# Patient Record
Sex: FEMALE
[De-identification: names, ages, dates, MRNs, and addresses within clinical notes are randomized per-mention and may not be internally consistent; named-entity substitution may affect disease eponyms.]

---

## 2019-03-13 ENCOUNTER — HOSPITAL ENCOUNTER (OUTPATIENT)
Dept: HOSPITAL 90 - RAH | Age: 74
Discharge: HOME | End: 2019-03-13
Attending: INTERNAL MEDICINE
Payer: MEDICARE

## 2019-03-13 DIAGNOSIS — I82.402: Primary | ICD-10-CM

## 2019-03-13 PROCEDURE — 93971 EXTREMITY STUDY: CPT

## 2019-06-20 ENCOUNTER — HOSPITAL ENCOUNTER (INPATIENT)
Dept: HOSPITAL 90 - EDH | Age: 74
LOS: 4 days | Discharge: NURSING, REHAB OR LONG-TERM CARE | DRG: 291 | End: 2019-06-24
Attending: INTERNAL MEDICINE | Admitting: INTERNAL MEDICINE
Payer: MEDICARE

## 2019-06-20 VITALS — WEIGHT: 251.8 LBS | BODY MASS INDEX: 44.61 KG/M2 | HEIGHT: 63 IN

## 2019-06-20 DIAGNOSIS — D64.9: ICD-10-CM

## 2019-06-20 DIAGNOSIS — I50.33: ICD-10-CM

## 2019-06-20 DIAGNOSIS — I11.0: Primary | ICD-10-CM

## 2019-06-20 DIAGNOSIS — B96.5: ICD-10-CM

## 2019-06-20 DIAGNOSIS — Z83.3: ICD-10-CM

## 2019-06-20 DIAGNOSIS — E78.5: ICD-10-CM

## 2019-06-20 DIAGNOSIS — E66.01: ICD-10-CM

## 2019-06-20 DIAGNOSIS — M32.9: ICD-10-CM

## 2019-06-20 DIAGNOSIS — Z82.49: ICD-10-CM

## 2019-06-20 DIAGNOSIS — J18.9: ICD-10-CM

## 2019-06-20 DIAGNOSIS — E11.9: ICD-10-CM

## 2019-06-20 DIAGNOSIS — G93.41: ICD-10-CM

## 2019-06-20 DIAGNOSIS — B37.1: ICD-10-CM

## 2019-06-20 DIAGNOSIS — R19.5: ICD-10-CM

## 2019-06-20 DIAGNOSIS — F41.9: ICD-10-CM

## 2019-06-20 DIAGNOSIS — D69.6: ICD-10-CM

## 2019-06-20 LAB
ALBUMIN SERPL-MCNC: 2.9 G/DL (ref 3.5–5)
ALT SERPL-CCNC: 17 U/L (ref 12–78)
AST SERPL-CCNC: 28 U/L (ref 10–37)
BASOPHILS NFR BLD AUTO: 0.4 % (ref 0–5)
BILIRUB SERPL-MCNC: 0.6 MG/DL (ref 0.2–1)
BNP SERPL-MCNC: 487 PG/ML (ref 0–100)
BUN SERPL-MCNC: 16 MG/DL (ref 7–18)
CHLORIDE SERPL-SCNC: 107 MMOL/L (ref 101–111)
CO2 SERPL-SCNC: 27 MMOL/L (ref 21–32)
CREAT SERPL-MCNC: 0.8 MG/DL (ref 0.5–1.5)
EOSINOPHIL NFR BLD AUTO: 1.7 % (ref 0–8)
ERYTHROCYTE [DISTWIDTH] IN BLOOD BY AUTOMATED COUNT: 15.5 % (ref 11–15.5)
FERRITIN SERPL-MCNC: 42 NG/ML (ref 15–150)
GFR SERPL CREATININE-BSD FRML MDRD: 75 ML/MIN (ref 60–?)
GLUCOSE SERPL-MCNC: 136 MG/DL (ref 70–105)
HBA1C MFR BLD: 6.2 % (ref 4–6)
HCT VFR BLD AUTO: 29 % (ref 36–48)
IRON SATN MFR SERPL: 13.8 % (ref 22–44)
IRON SERPL-MCNC: 50 MCG/DL (ref 50–170)
LYMPHOCYTES NFR SPEC AUTO: 14.2 % (ref 21–51)
MCH RBC QN AUTO: 30 PG (ref 27–33)
MCHC RBC AUTO-ENTMCNC: 33 G/DL (ref 32–36)
MCV RBC AUTO: 90.8 FL (ref 79–99)
MONOCYTES NFR BLD AUTO: 6.3 % (ref 3–13)
NEUTROPHILS NFR BLD AUTO: 77.4 % (ref 40–77)
NRBC BLD MANUAL-RTO: 0.1 % (ref 0–0.19)
PH UR STRIP: 5.5 [PH] (ref 5–8)
PLAT MORPH BLD: (no result)
PLATELET # BLD AUTO: 65 K/UL (ref 130–400)
POTASSIUM SERPL-SCNC: 4.5 MMOL/L (ref 3.5–5.1)
PROT SERPL-MCNC: 7.6 G/DL (ref 6–8.3)
PROT UR QL STRIP: (no result) MG/DL
RBC # BLD AUTO: 3.19 MIL/UL (ref 4–5.5)
RBC #/AREA URNS HPF: (no result) /HPF (ref 0–1)
SODIUM SERPL-SCNC: 141 MMOL/L (ref 136–145)
SP GR UR STRIP: 1.01 (ref 1–1.03)
TIBC SERPL-MCNC: 362 MCG/DL (ref 250–450)
UROBILINOGEN UR STRIP-MCNC: 0.2 MG/DL (ref 0.2–1)
WBC # BLD AUTO: 3.1 K/UL (ref 4.8–10.8)
WBC #/AREA URNS HPF: (no result) /HPF (ref 0–1)

## 2019-06-20 PROCEDURE — 83036 HEMOGLOBIN GLYCOSYLATED A1C: CPT

## 2019-06-20 PROCEDURE — 87633 RESP VIRUS 12-25 TARGETS: CPT

## 2019-06-20 PROCEDURE — 87486 CHLMYD PNEUM DNA AMP PROBE: CPT

## 2019-06-20 PROCEDURE — 87581 M.PNEUMON DNA AMP PROBE: CPT

## 2019-06-20 PROCEDURE — 83540 ASSAY OF IRON: CPT

## 2019-06-20 PROCEDURE — 94640 AIRWAY INHALATION TREATMENT: CPT

## 2019-06-20 PROCEDURE — 87040 BLOOD CULTURE FOR BACTERIA: CPT

## 2019-06-20 PROCEDURE — 85610 PROTHROMBIN TIME: CPT

## 2019-06-20 PROCEDURE — 83880 ASSAY OF NATRIURETIC PEPTIDE: CPT

## 2019-06-20 PROCEDURE — 82948 REAGENT STRIP/BLOOD GLUCOSE: CPT

## 2019-06-20 PROCEDURE — 93005 ELECTROCARDIOGRAM TRACING: CPT

## 2019-06-20 PROCEDURE — 87205 SMEAR GRAM STAIN: CPT

## 2019-06-20 PROCEDURE — 80053 COMPREHEN METABOLIC PANEL: CPT

## 2019-06-20 PROCEDURE — 87077 CULTURE AEROBIC IDENTIFY: CPT

## 2019-06-20 PROCEDURE — 81001 URINALYSIS AUTO W/SCOPE: CPT

## 2019-06-20 PROCEDURE — 83735 ASSAY OF MAGNESIUM: CPT

## 2019-06-20 PROCEDURE — 97039 UNLISTED MODALITY: CPT

## 2019-06-20 PROCEDURE — 71045 X-RAY EXAM CHEST 1 VIEW: CPT

## 2019-06-20 PROCEDURE — 36415 COLL VENOUS BLD VENIPUNCTURE: CPT

## 2019-06-20 PROCEDURE — 84484 ASSAY OF TROPONIN QUANT: CPT

## 2019-06-20 PROCEDURE — 80048 BASIC METABOLIC PNL TOTAL CA: CPT

## 2019-06-20 PROCEDURE — 82270 OCCULT BLOOD FECES: CPT

## 2019-06-20 PROCEDURE — 82728 ASSAY OF FERRITIN: CPT

## 2019-06-20 PROCEDURE — 87071 CULTURE AEROBIC QUANT OTHER: CPT

## 2019-06-20 PROCEDURE — 87798 DETECT AGENT NOS DNA AMP: CPT

## 2019-06-20 PROCEDURE — 83550 IRON BINDING TEST: CPT

## 2019-06-20 PROCEDURE — 85025 COMPLETE CBC W/AUTO DIFF WBC: CPT

## 2019-06-20 PROCEDURE — 94664 DEMO&/EVAL PT USE INHALER: CPT

## 2019-06-20 PROCEDURE — 87186 SC STD MICRODIL/AGAR DIL: CPT

## 2019-06-20 RX ADMIN — SODIUM CHLORIDE SCH UNIT: 9 INJECTION, SOLUTION INTRAVENOUS at 21:00

## 2019-06-20 RX ADMIN — FAMOTIDINE SCH MG: 20 TABLET, FILM COATED ORAL at 21:00

## 2019-06-20 RX ADMIN — LEVOFLOXACIN SCH MLS/HR: 5 INJECTION, SOLUTION INTRAVENOUS at 20:30

## 2019-06-21 VITALS — SYSTOLIC BLOOD PRESSURE: 160 MMHG | DIASTOLIC BLOOD PRESSURE: 74 MMHG

## 2019-06-21 VITALS — DIASTOLIC BLOOD PRESSURE: 81 MMHG | SYSTOLIC BLOOD PRESSURE: 186 MMHG

## 2019-06-21 VITALS — DIASTOLIC BLOOD PRESSURE: 81 MMHG | SYSTOLIC BLOOD PRESSURE: 185 MMHG

## 2019-06-21 LAB
ALBUMIN SERPL-MCNC: 2.8 G/DL (ref 3.5–5)
ALT SERPL-CCNC: 14 U/L (ref 12–78)
AST SERPL-CCNC: 22 U/L (ref 10–37)
BASOPHILS NFR BLD AUTO: 0.7 % (ref 0–5)
BILIRUB SERPL-MCNC: 0.9 MG/DL (ref 0.2–1)
BNP SERPL-MCNC: 586 PG/ML (ref 0–100)
BUN SERPL-MCNC: 14 MG/DL (ref 7–18)
CHLORIDE SERPL-SCNC: 106 MMOL/L (ref 101–111)
CO2 SERPL-SCNC: 26 MMOL/L (ref 21–32)
CREAT SERPL-MCNC: 1 MG/DL (ref 0.5–1.5)
EOSINOPHIL NFR BLD AUTO: 1.5 % (ref 0–8)
EOSINOPHIL NFR BLD MANUAL: 1 % (ref 1–6)
ERYTHROCYTE [DISTWIDTH] IN BLOOD BY AUTOMATED COUNT: 15.5 % (ref 11–15.5)
GFR SERPL CREATININE-BSD FRML MDRD: 58 ML/MIN (ref 60–?)
GLUCOSE SERPL-MCNC: 149 MG/DL (ref 70–105)
HCT VFR BLD AUTO: 29 % (ref 36–48)
LYMPHOCYTES NFR BLD MANUAL: 13 % (ref 22–44)
LYMPHOCYTES NFR SPEC AUTO: 16.8 % (ref 21–51)
MAGNESIUM SERPL-MCNC: 1.6 MG/DL (ref 1.8–2.4)
MANUAL DIF COMMENT BLD-IMP: (no result)
MCH RBC QN AUTO: 29.6 PG (ref 27–33)
MCHC RBC AUTO-ENTMCNC: 33 G/DL (ref 32–36)
MCV RBC AUTO: 89.7 FL (ref 79–99)
MONOCYTES NFR BLD AUTO: 8.2 % (ref 3–13)
MONOCYTES NFR BLD MANUAL: 3 % (ref 2–9)
NEUTROPHILS NFR BLD AUTO: 72.8 % (ref 40–77)
NEUTS SEG NFR BLD MANUAL: 83 % (ref 40–70)
NRBC BLD MANUAL-RTO: 0 % (ref 0–0.19)
PLAT MORPH BLD: (no result)
PLATELET # BLD AUTO: 69 K/UL (ref 130–400)
POTASSIUM SERPL-SCNC: 3.8 MMOL/L (ref 3.5–5.1)
PROT SERPL-MCNC: 7.3 G/DL (ref 6–8.3)
RBC # BLD AUTO: 3.23 MIL/UL (ref 4–5.5)
RBC MORPH BLD: (no result)
SODIUM SERPL-SCNC: 142 MMOL/L (ref 136–145)
WBC # BLD AUTO: 2.9 K/UL (ref 4.8–10.8)

## 2019-06-21 RX ADMIN — METOPROLOL TARTRATE SCH MG: 50 TABLET, FILM COATED ORAL at 21:59

## 2019-06-21 RX ADMIN — FUROSEMIDE SCH MG: 10 INJECTION, SOLUTION INTRAMUSCULAR; INTRAVENOUS at 20:15

## 2019-06-21 RX ADMIN — FAMOTIDINE SCH MG: 20 TABLET, FILM COATED ORAL at 21:59

## 2019-06-21 RX ADMIN — LEVOFLOXACIN SCH MLS/HR: 5 INJECTION, SOLUTION INTRAVENOUS at 21:58

## 2019-06-21 RX ADMIN — SODIUM CHLORIDE SCH UNIT: 9 INJECTION, SOLUTION INTRAVENOUS at 11:30

## 2019-06-21 RX ADMIN — FUROSEMIDE SCH MG: 10 INJECTION, SOLUTION INTRAMUSCULAR; INTRAVENOUS at 12:22

## 2019-06-21 RX ADMIN — IPRATROPIUM BROMIDE AND ALBUTEROL SULFATE PRN UDVIAL: .5; 3 SOLUTION RESPIRATORY (INHALATION) at 20:13

## 2019-06-21 RX ADMIN — SODIUM CHLORIDE SCH UNIT: 9 INJECTION, SOLUTION INTRAVENOUS at 16:30

## 2019-06-21 RX ADMIN — ALPRAZOLAM PRN MG: 0.25 TABLET ORAL at 22:32

## 2019-06-21 RX ADMIN — FAMOTIDINE SCH MG: 20 TABLET, FILM COATED ORAL at 12:17

## 2019-06-21 RX ADMIN — SODIUM CHLORIDE SCH UNIT: 9 INJECTION, SOLUTION INTRAVENOUS at 21:00

## 2019-06-21 RX ADMIN — SODIUM CHLORIDE SCH UNIT: 9 INJECTION, SOLUTION INTRAVENOUS at 07:30

## 2019-06-21 RX ADMIN — Medication PRN MG: at 22:32

## 2019-06-21 NOTE — NUR
GENNA Olivas met with pt who lives alone, has provider 4hrs a day thru [facility] to assist with 
ADLS, and home management. Pt has cane, walker and w/c. Pt states she needs O2 for home. Pt 
has no HH services, daughter kajal Oliveira [phone] is ER contact

-------------------------------------------------------------------------------

Addendum: 06/21/19 at 1316 by TOAN CAMERON SS

-------------------------------------------------------------------------------

Amended: Links added.

## 2019-06-21 NOTE — NUR
JULIO C ARIZA, OF DR. THOMAS, CALLED BACK

INFORMATION GIVEN TO HER, STATES WILL CALL YOU AGAIN WITH RECOMMENDATIONS.

## 2019-06-22 VITALS — DIASTOLIC BLOOD PRESSURE: 62 MMHG | SYSTOLIC BLOOD PRESSURE: 145 MMHG

## 2019-06-22 VITALS — DIASTOLIC BLOOD PRESSURE: 52 MMHG | SYSTOLIC BLOOD PRESSURE: 128 MMHG

## 2019-06-22 VITALS — SYSTOLIC BLOOD PRESSURE: 183 MMHG | DIASTOLIC BLOOD PRESSURE: 73 MMHG

## 2019-06-22 VITALS — SYSTOLIC BLOOD PRESSURE: 137 MMHG | DIASTOLIC BLOOD PRESSURE: 81 MMHG

## 2019-06-22 VITALS — SYSTOLIC BLOOD PRESSURE: 148 MMHG | DIASTOLIC BLOOD PRESSURE: 67 MMHG

## 2019-06-22 VITALS — DIASTOLIC BLOOD PRESSURE: 66 MMHG | SYSTOLIC BLOOD PRESSURE: 150 MMHG

## 2019-06-22 VITALS — DIASTOLIC BLOOD PRESSURE: 55 MMHG | SYSTOLIC BLOOD PRESSURE: 130 MMHG

## 2019-06-22 LAB
BASOPHILS NFR BLD AUTO: 1.4 % (ref 0–5)
BUN SERPL-MCNC: 15 MG/DL (ref 7–18)
CHLORIDE SERPL-SCNC: 101 MMOL/L (ref 101–111)
CO2 SERPL-SCNC: 31 MMOL/L (ref 21–32)
CREAT SERPL-MCNC: 1.1 MG/DL (ref 0.5–1.5)
EOSINOPHIL NFR BLD AUTO: 2.8 % (ref 0–8)
ERYTHROCYTE [DISTWIDTH] IN BLOOD BY AUTOMATED COUNT: 15.2 % (ref 11–15.5)
GFR SERPL CREATININE-BSD FRML MDRD: 52 ML/MIN (ref 60–?)
GLUCOSE SERPL-MCNC: 148 MG/DL (ref 70–105)
HCT VFR BLD AUTO: 33.2 % (ref 36–48)
LYMPHOCYTES NFR SPEC AUTO: 15.4 % (ref 21–51)
MCH RBC QN AUTO: 29.8 PG (ref 27–33)
MCHC RBC AUTO-ENTMCNC: 33.4 G/DL (ref 32–36)
MCV RBC AUTO: 89.3 FL (ref 79–99)
MONOCYTES NFR BLD AUTO: 6.5 % (ref 3–13)
NEUTROPHILS NFR BLD AUTO: 73.9 % (ref 40–77)
NRBC BLD MANUAL-RTO: 0 % (ref 0–0.19)
PLATELET # BLD AUTO: 86 K/UL (ref 130–400)
POTASSIUM SERPL-SCNC: 3.5 MMOL/L (ref 3.5–5.1)
RBC # BLD AUTO: 3.72 MIL/UL (ref 4–5.5)
SODIUM SERPL-SCNC: 140 MMOL/L (ref 136–145)
WBC # BLD AUTO: 4.2 K/UL (ref 4.8–10.8)

## 2019-06-22 RX ADMIN — SODIUM CHLORIDE SCH UNIT: 9 INJECTION, SOLUTION INTRAVENOUS at 16:30

## 2019-06-22 RX ADMIN — LEVOFLOXACIN SCH MLS/HR: 5 INJECTION, SOLUTION INTRAVENOUS at 19:52

## 2019-06-22 RX ADMIN — METOPROLOL TARTRATE SCH MG: 50 TABLET, FILM COATED ORAL at 08:39

## 2019-06-22 RX ADMIN — CHLORTHALIDONE SCH EACH: 50 TABLET ORAL at 08:53

## 2019-06-22 RX ADMIN — LEVOTHYROXINE SODIUM SCH MCG: 50 TABLET ORAL at 05:45

## 2019-06-22 RX ADMIN — SODIUM CHLORIDE SCH UNIT: 9 INJECTION, SOLUTION INTRAVENOUS at 05:45

## 2019-06-22 RX ADMIN — FAMOTIDINE SCH MG: 20 TABLET, FILM COATED ORAL at 08:39

## 2019-06-22 RX ADMIN — FUROSEMIDE SCH MG: 10 INJECTION, SOLUTION INTRAMUSCULAR; INTRAVENOUS at 17:20

## 2019-06-22 RX ADMIN — SODIUM CHLORIDE SCH UNIT: 9 INJECTION, SOLUTION INTRAVENOUS at 21:18

## 2019-06-22 RX ADMIN — POTASSIUM CHLORIDE PRN MEQ: 1500 TABLET, EXTENDED RELEASE ORAL at 17:21

## 2019-06-22 RX ADMIN — POTASSIUM CHLORIDE PRN MEQ: 1500 TABLET, EXTENDED RELEASE ORAL at 19:52

## 2019-06-22 RX ADMIN — CHLORTHALIDONE SCH EACH: 50 TABLET ORAL at 21:00

## 2019-06-22 RX ADMIN — SODIUM CHLORIDE SCH UNIT: 9 INJECTION, SOLUTION INTRAVENOUS at 11:30

## 2019-06-22 RX ADMIN — FUROSEMIDE SCH MG: 10 INJECTION, SOLUTION INTRAMUSCULAR; INTRAVENOUS at 08:38

## 2019-06-22 RX ADMIN — FAMOTIDINE SCH MG: 20 TABLET, FILM COATED ORAL at 21:10

## 2019-06-22 RX ADMIN — METOPROLOL TARTRATE SCH MG: 50 TABLET, FILM COATED ORAL at 21:11

## 2019-06-23 VITALS — DIASTOLIC BLOOD PRESSURE: 44 MMHG | SYSTOLIC BLOOD PRESSURE: 109 MMHG

## 2019-06-23 VITALS — SYSTOLIC BLOOD PRESSURE: 136 MMHG | DIASTOLIC BLOOD PRESSURE: 70 MMHG

## 2019-06-23 VITALS — DIASTOLIC BLOOD PRESSURE: 70 MMHG | SYSTOLIC BLOOD PRESSURE: 155 MMHG

## 2019-06-23 VITALS — SYSTOLIC BLOOD PRESSURE: 178 MMHG | DIASTOLIC BLOOD PRESSURE: 75 MMHG

## 2019-06-23 VITALS — DIASTOLIC BLOOD PRESSURE: 71 MMHG | SYSTOLIC BLOOD PRESSURE: 163 MMHG

## 2019-06-23 VITALS — DIASTOLIC BLOOD PRESSURE: 64 MMHG | SYSTOLIC BLOOD PRESSURE: 140 MMHG

## 2019-06-23 LAB
BASOPHILS NFR BLD AUTO: 0.7 % (ref 0–5)
BUN SERPL-MCNC: 19 MG/DL (ref 7–18)
CHLORIDE SERPL-SCNC: 102 MMOL/L (ref 101–111)
CO2 SERPL-SCNC: 31 MMOL/L (ref 21–32)
CREAT SERPL-MCNC: 1.1 MG/DL (ref 0.5–1.5)
EOSINOPHIL NFR BLD AUTO: 3 % (ref 0–8)
ERYTHROCYTE [DISTWIDTH] IN BLOOD BY AUTOMATED COUNT: 15.5 % (ref 11–15.5)
GFR SERPL CREATININE-BSD FRML MDRD: 52 ML/MIN (ref 60–?)
GLUCOSE SERPL-MCNC: 135 MG/DL (ref 70–105)
HCT VFR BLD AUTO: 30.6 % (ref 36–48)
LYMPHOCYTES NFR SPEC AUTO: 18.9 % (ref 21–51)
MCH RBC QN AUTO: 29.5 PG (ref 27–33)
MCHC RBC AUTO-ENTMCNC: 33.1 G/DL (ref 32–36)
MCV RBC AUTO: 89.2 FL (ref 79–99)
MONOCYTES NFR BLD AUTO: 8.5 % (ref 3–13)
NEUTROPHILS NFR BLD AUTO: 68.9 % (ref 40–77)
NRBC BLD MANUAL-RTO: 0.1 % (ref 0–0.19)
PLATELET # BLD AUTO: 77 K/UL (ref 130–400)
POTASSIUM SERPL-SCNC: 3.7 MMOL/L (ref 3.5–5.1)
RBC # BLD AUTO: 3.43 MIL/UL (ref 4–5.5)
SODIUM SERPL-SCNC: 141 MMOL/L (ref 136–145)
WBC # BLD AUTO: 3.8 K/UL (ref 4.8–10.8)

## 2019-06-23 RX ADMIN — CHLORTHALIDONE SCH EACH: 50 TABLET ORAL at 09:00

## 2019-06-23 RX ADMIN — SODIUM CHLORIDE SCH UNIT: 9 INJECTION, SOLUTION INTRAVENOUS at 16:30

## 2019-06-23 RX ADMIN — ALPRAZOLAM PRN MG: 0.25 TABLET ORAL at 04:19

## 2019-06-23 RX ADMIN — FUROSEMIDE SCH MG: 10 INJECTION, SOLUTION INTRAMUSCULAR; INTRAVENOUS at 09:13

## 2019-06-23 RX ADMIN — FAMOTIDINE SCH MG: 20 TABLET, FILM COATED ORAL at 09:13

## 2019-06-23 RX ADMIN — SODIUM CHLORIDE SCH UNIT: 9 INJECTION, SOLUTION INTRAVENOUS at 21:49

## 2019-06-23 RX ADMIN — LEVOFLOXACIN SCH MLS/HR: 5 INJECTION, SOLUTION INTRAVENOUS at 20:21

## 2019-06-23 RX ADMIN — POTASSIUM CHLORIDE PRN MEQ: 1500 TABLET, EXTENDED RELEASE ORAL at 06:38

## 2019-06-23 RX ADMIN — SODIUM CHLORIDE SCH UNIT: 9 INJECTION, SOLUTION INTRAVENOUS at 12:23

## 2019-06-23 RX ADMIN — LEVOTHYROXINE SODIUM SCH MCG: 50 TABLET ORAL at 06:38

## 2019-06-23 RX ADMIN — METOPROLOL TARTRATE SCH MG: 50 TABLET, FILM COATED ORAL at 20:20

## 2019-06-23 RX ADMIN — SODIUM CHLORIDE SCH UNIT: 9 INJECTION, SOLUTION INTRAVENOUS at 06:33

## 2019-06-23 RX ADMIN — IPRATROPIUM BROMIDE AND ALBUTEROL SULFATE PRN UDVIAL: .5; 3 SOLUTION RESPIRATORY (INHALATION) at 19:01

## 2019-06-23 RX ADMIN — ALPRAZOLAM PRN MG: 0.25 TABLET ORAL at 21:53

## 2019-06-23 RX ADMIN — Medication PRN MG: at 10:28

## 2019-06-23 RX ADMIN — CHLORTHALIDONE SCH EACH: 50 TABLET ORAL at 21:00

## 2019-06-23 RX ADMIN — FUROSEMIDE SCH MG: 10 INJECTION, SOLUTION INTRAMUSCULAR; INTRAVENOUS at 17:39

## 2019-06-23 RX ADMIN — METOPROLOL TARTRATE SCH MG: 50 TABLET, FILM COATED ORAL at 09:13

## 2019-06-23 RX ADMIN — FAMOTIDINE SCH MG: 20 TABLET, FILM COATED ORAL at 20:21

## 2019-06-23 RX ADMIN — IPRATROPIUM BROMIDE AND ALBUTEROL SULFATE PRN UDVIAL: .5; 3 SOLUTION RESPIRATORY (INHALATION) at 06:18

## 2019-06-23 NOTE — NUR
RD Notification



Pt tolerating 75gm CCD with report of no GI distress and Good PO intake at %. Pt LBM 
6/22/19. Pt monitored labs: BUN 19, GFR 52, Glu 131, Alb 2.8. Pt reports no nutritional 
questions or concerns at this time. RD to continue to monitor. Please notify RD as 
additional nutrition concerns arise. Thank you. 

-------------------------------------------------------------------------------

Addendum: 06/23/19 at [address] RD

-------------------------------------------------------------------------------

Amended: Links added.

## 2019-06-24 VITALS — SYSTOLIC BLOOD PRESSURE: 156 MMHG | DIASTOLIC BLOOD PRESSURE: 70 MMHG

## 2019-06-24 VITALS — DIASTOLIC BLOOD PRESSURE: 71 MMHG | SYSTOLIC BLOOD PRESSURE: 125 MMHG

## 2019-06-24 VITALS — DIASTOLIC BLOOD PRESSURE: 54 MMHG | SYSTOLIC BLOOD PRESSURE: 157 MMHG

## 2019-06-24 VITALS — SYSTOLIC BLOOD PRESSURE: 125 MMHG | DIASTOLIC BLOOD PRESSURE: 60 MMHG

## 2019-06-24 VITALS — SYSTOLIC BLOOD PRESSURE: 123 MMHG | DIASTOLIC BLOOD PRESSURE: 65 MMHG

## 2019-06-24 LAB
INR PPP: 1.24 (ref 0.85–1.15)
PROTHROMBIN TIME: 13 SEC (ref 9.6–11.6)

## 2019-06-24 PROCEDURE — 02HV33Z INSERTION OF INFUSION DEVICE INTO SUPERIOR VENA CAVA, PERCUTANEOUS APPROACH: ICD-10-PCS | Performed by: RADIOLOGY

## 2019-06-24 RX ADMIN — SODIUM CHLORIDE SCH UNIT: 9 INJECTION, SOLUTION INTRAVENOUS at 07:03

## 2019-06-24 RX ADMIN — PIPERACILLIN SODIUM AND TAZOBACTAM SODIUM SCH MLS/HR: .375; 3 INJECTION, POWDER, LYOPHILIZED, FOR SOLUTION INTRAVENOUS at 11:40

## 2019-06-24 RX ADMIN — LEVOTHYROXINE SODIUM SCH MCG: 50 TABLET ORAL at 07:05

## 2019-06-24 RX ADMIN — SODIUM CHLORIDE SCH UNIT: 9 INJECTION, SOLUTION INTRAVENOUS at 11:47

## 2019-06-24 RX ADMIN — FUROSEMIDE SCH MG: 10 INJECTION, SOLUTION INTRAMUSCULAR; INTRAVENOUS at 16:51

## 2019-06-24 RX ADMIN — FUROSEMIDE SCH MG: 10 INJECTION, SOLUTION INTRAMUSCULAR; INTRAVENOUS at 11:41

## 2019-06-24 RX ADMIN — FAMOTIDINE SCH MG: 20 TABLET, FILM COATED ORAL at 11:41

## 2019-06-24 RX ADMIN — SODIUM CHLORIDE SCH UNIT: 9 INJECTION, SOLUTION INTRAVENOUS at 16:30

## 2019-06-24 RX ADMIN — METOPROLOL TARTRATE SCH MG: 50 TABLET, FILM COATED ORAL at 11:40

## 2019-06-24 RX ADMIN — PIPERACILLIN SODIUM AND TAZOBACTAM SODIUM SCH MLS/HR: .375; 3 INJECTION, POWDER, LYOPHILIZED, FOR SOLUTION INTRAVENOUS at 16:51

## 2019-06-24 RX ADMIN — CHLORTHALIDONE SCH EACH: 50 TABLET ORAL at 09:00

## 2019-06-24 NOTE — NUR
ACCEPTED BY MEEK


-------------------------------------------------------------------------------

Addendum: 06/24/19 at 1747 by JAVED TRAVIS RN CM

-------------------------------------------------------------------------------

Amended: Links added.

## 2019-06-24 NOTE — NUR
REFERRAL TO [facility] IN PROCESS

PICC LINE, ORDER, [facility] FROM [facility] WAS HERE TO SEE PATIENT, MED REC WAS FAXED,CIARA PATEL WAS 
AWARE; ALL GO FOR THIS PT.

 

-------------------------------------------------------------------------------

Addendum: 06/24/19 at 1747 by JAVED TRAVIS RN CM

-------------------------------------------------------------------------------

Amended: Links added.

## 2019-06-24 NOTE — NUR
[facility] STAFF HERE, PATIENT DISCHARGED TO SNF.

USING TEACH BACK TECH. RE. NEW MEDS, HOME MEDS, AND WHAT TO CONTINUE AT [facility].

YOU WILL CONTINUE CARE AT [facility] FOR CONTINUITY OF CARE FOR ANTIBIOTIC 
THERAPY.



FOLLOW UP WITH YOUR PRIMARY DOCTOR ERIKA IN 1 WEEK AFTER DISCHARGE FROM [facility] 
[facility]AC[facility].



IF HAVING FEVERS, CHILLS, SHORTNESS OF BREATH OR COUGH WORSENS CALL YOUR PRIMARY DOCTOR.



CALL 911 IF CHEST PAIN OR SHORTNESS OF BREATH DOES NOT RESOLVE WITH REST.



REPORT GIVEN TO CINDY BROWN FROM [facility], PICC LINE IN PLACE AS PER DR. BURNETT, AND OKAY TO 
USE. INTACT NO BLEEDING.

IV OUT INTACT, NO BLEEDING, TELE REMOVED BY MARVIN HURST. AAOX3, NO SOB. DAUGHTERS AT 
BEDSIDE.

## 2023-06-19 ENCOUNTER — HOSPITAL ENCOUNTER (OUTPATIENT)
Dept: HOSPITAL 90 - SHCH | Age: 78
Discharge: HOME | End: 2023-06-19
Attending: INTERNAL MEDICINE
Payer: COMMERCIAL

## 2023-06-19 DIAGNOSIS — I35.0: Primary | ICD-10-CM

## 2023-06-19 PROCEDURE — 93306 TTE W/DOPPLER COMPLETE: CPT
